# Patient Record
Sex: FEMALE | Race: BLACK OR AFRICAN AMERICAN | NOT HISPANIC OR LATINO
[De-identification: names, ages, dates, MRNs, and addresses within clinical notes are randomized per-mention and may not be internally consistent; named-entity substitution may affect disease eponyms.]

---

## 2017-12-04 ENCOUNTER — APPOINTMENT (OUTPATIENT)
Dept: NEUROLOGY | Facility: CLINIC | Age: 77
End: 2017-12-04
Payer: MEDICARE

## 2017-12-04 VITALS
BODY MASS INDEX: 30.39 KG/M2 | SYSTOLIC BLOOD PRESSURE: 148 MMHG | HEIGHT: 64.25 IN | DIASTOLIC BLOOD PRESSURE: 66 MMHG | HEART RATE: 84 BPM | WEIGHT: 178 LBS

## 2017-12-04 DIAGNOSIS — R41.3 OTHER AMNESIA: ICD-10-CM

## 2017-12-04 DIAGNOSIS — Z80.9 FAMILY HISTORY OF MALIGNANT NEOPLASM, UNSPECIFIED: ICD-10-CM

## 2017-12-04 DIAGNOSIS — N18.3 CHRONIC KIDNEY DISEASE, STAGE 3 (MODERATE): ICD-10-CM

## 2017-12-04 DIAGNOSIS — Z83.518 FAMILY HISTORY OF OTHER SPECIFIED EYE DISORDER: ICD-10-CM

## 2017-12-04 DIAGNOSIS — Z86.39 PERSONAL HISTORY OF OTHER ENDOCRINE, NUTRITIONAL AND METABOLIC DISEASE: ICD-10-CM

## 2017-12-04 DIAGNOSIS — M54.5 LOW BACK PAIN: ICD-10-CM

## 2017-12-04 DIAGNOSIS — F32.9 MAJOR DEPRESSIVE DISORDER, SINGLE EPISODE, UNSPECIFIED: ICD-10-CM

## 2017-12-04 DIAGNOSIS — Z83.3 FAMILY HISTORY OF DIABETES MELLITUS: ICD-10-CM

## 2017-12-04 DIAGNOSIS — G45.9 TRANSIENT CEREBRAL ISCHEMIC ATTACK, UNSPECIFIED: ICD-10-CM

## 2017-12-04 DIAGNOSIS — H91.93 UNSPECIFIED HEARING LOSS, BILATERAL: ICD-10-CM

## 2017-12-04 DIAGNOSIS — H40.819: ICD-10-CM

## 2017-12-04 DIAGNOSIS — Z82.49 FAMILY HISTORY OF ISCHEMIC HEART DISEASE AND OTHER DISEASES OF THE CIRCULATORY SYSTEM: ICD-10-CM

## 2017-12-04 DIAGNOSIS — Z86.79 PERSONAL HISTORY OF OTHER DISEASES OF THE CIRCULATORY SYSTEM: ICD-10-CM

## 2017-12-04 DIAGNOSIS — Z86.69 PERSONAL HISTORY OF OTHER DISEASES OF THE NERVOUS SYSTEM AND SENSE ORGANS: ICD-10-CM

## 2017-12-04 PROCEDURE — 99204 OFFICE O/P NEW MOD 45 MIN: CPT

## 2017-12-14 ENCOUNTER — APPOINTMENT (OUTPATIENT)
Dept: NEUROLOGY | Facility: CLINIC | Age: 77
End: 2017-12-14

## 2018-01-05 ENCOUNTER — APPOINTMENT (OUTPATIENT)
Dept: NEUROLOGY | Facility: CLINIC | Age: 78
End: 2018-01-05

## 2020-03-21 ENCOUNTER — EMERGENCY (EMERGENCY)
Facility: HOSPITAL | Age: 80
LOS: 1 days | Discharge: HOME | End: 2020-03-21
Attending: EMERGENCY MEDICINE
Payer: COMMERCIAL

## 2020-03-21 VITALS
WEIGHT: 169.98 LBS | SYSTOLIC BLOOD PRESSURE: 184 MMHG | HEART RATE: 66 BPM | HEIGHT: 65 IN | RESPIRATION RATE: 18 BRPM | DIASTOLIC BLOOD PRESSURE: 76 MMHG | TEMPERATURE: 98 F | OXYGEN SATURATION: 100 %

## 2020-03-21 LAB
BASOPHILS # BLD AUTO: 0.04 K/UL — SIGNIFICANT CHANGE UP (ref 0–0.2)
BASOPHILS NFR BLD AUTO: 0.4 % — SIGNIFICANT CHANGE UP (ref 0–2)
EOSINOPHIL # BLD AUTO: 0 K/UL — SIGNIFICANT CHANGE UP (ref 0–0.5)
EOSINOPHIL NFR BLD AUTO: 0 % — SIGNIFICANT CHANGE UP (ref 0–6)
HCT VFR BLD CALC: 36.2 % — SIGNIFICANT CHANGE UP (ref 34.5–45)
HGB BLD-MCNC: 11.9 G/DL — SIGNIFICANT CHANGE UP (ref 11.5–15.5)
IMM GRANULOCYTES NFR BLD AUTO: 0.3 % — SIGNIFICANT CHANGE UP (ref 0–1.5)
LYMPHOCYTES # BLD AUTO: 0.65 K/UL — LOW (ref 1–3.3)
LYMPHOCYTES # BLD AUTO: 6.4 % — LOW (ref 13–44)
MCHC RBC-ENTMCNC: 31.6 PG — SIGNIFICANT CHANGE UP (ref 27–34)
MCHC RBC-ENTMCNC: 32.9 GM/DL — SIGNIFICANT CHANGE UP (ref 32–36)
MCV RBC AUTO: 96 FL — SIGNIFICANT CHANGE UP (ref 80–100)
MONOCYTES # BLD AUTO: 0.37 K/UL — SIGNIFICANT CHANGE UP (ref 0–0.9)
MONOCYTES NFR BLD AUTO: 3.7 % — SIGNIFICANT CHANGE UP (ref 2–14)
NEUTROPHILS # BLD AUTO: 9 K/UL — HIGH (ref 1.8–7.4)
NEUTROPHILS NFR BLD AUTO: 89.2 % — HIGH (ref 43–77)
PLATELET # BLD AUTO: 326 K/UL — SIGNIFICANT CHANGE UP (ref 150–400)
RBC # BLD: 3.77 M/UL — LOW (ref 3.8–5.2)
RBC # FLD: 12.1 % — SIGNIFICANT CHANGE UP (ref 10.3–14.5)
WBC # BLD: 10.09 K/UL — SIGNIFICANT CHANGE UP (ref 3.8–10.5)
WBC # FLD AUTO: 10.09 K/UL — SIGNIFICANT CHANGE UP (ref 3.8–10.5)

## 2020-03-21 PROCEDURE — 99285 EMERGENCY DEPT VISIT HI MDM: CPT

## 2020-03-21 PROCEDURE — 93010 ELECTROCARDIOGRAM REPORT: CPT

## 2020-03-21 RX ORDER — ACETAMINOPHEN 500 MG
650 TABLET ORAL ONCE
Refills: 0 | Status: COMPLETED | OUTPATIENT
Start: 2020-03-21 | End: 2020-03-21

## 2020-03-21 RX ORDER — METOCLOPRAMIDE HCL 10 MG
10 TABLET ORAL ONCE
Refills: 0 | Status: COMPLETED | OUTPATIENT
Start: 2020-03-21 | End: 2020-03-21

## 2020-03-21 RX ADMIN — Medication 650 MILLIGRAM(S): at 23:07

## 2020-03-21 RX ADMIN — Medication 104 MILLIGRAM(S): at 23:07

## 2020-03-21 NOTE — ED ADULT NURSE NOTE - CAS ELECT INFOMATION PROVIDED
DC instructions/DC instructions provided to the patient. Patient in understanding of all dc instructions. VSS. Ambulatory.

## 2020-03-21 NOTE — ED PROVIDER NOTE - FAMILY DETAILS FREE TEXT FOR MDM ADDL HISTORY OBTAINED FROM QUESTION
Patient's daughter Swapna states that patient was diaphoretic at home and kept reporting that she was having dizziness with the room spinning.  Her and her sister checked her mother's sugar and it was not low.  She states patient has not taken her medication today because of nausea.

## 2020-03-21 NOTE — ED ADULT NURSE NOTE - OBJECTIVE STATEMENT
pt presents to the ed a&ox3 c/o headache and nausea, denies any vomiting or chest pain. no s/s of acute distress, seen and eval by md mathias, labs collected and sent, ekg complete and orthostatics complete.

## 2020-03-21 NOTE — ED PROVIDER NOTE - OBJECTIVE STATEMENT
This patient is a 79 year old woman with hx of "tension migraines" who presents to the ER c/o headache and nausea x 1 day.  She denies injury, fall, or trauma.  Patient describes the headache as a dull pressure in the back of her head 3/10 in severity.  She did not take any medication for pain.  Patient states that she often times has nausea with her headaches but today she states that the nausea was much worse. This patient is a 79 year old woman with hx of "tension migraines" who presents to the ER c/o headache and nausea x 1 day.  She denies injury, fall, or trauma.  Patient describes the headache as a dull pressure in the back of her head 3/10 in severity.  She did not take any medication for pain.  Patient states that she often times has nausea with her headaches but today she states that the nausea was much worse.  Associated symptoms include lightheadedness she denies vertigo.

## 2020-03-21 NOTE — ED PROVIDER NOTE - NEUROLOGICAL, MLM
Alert and oriented, no focal deficits, no motor or sensory deficits.  Normal finger to nose; normal gait no ataxia

## 2020-03-21 NOTE — ED PROVIDER NOTE - PROGRESS NOTE DETAILS
An attempt was made to reach the patient's daughter no answer when calling phone number listed in the chart. Patient's daughter called stating her mother was diaphoretic and having vertigo. milton: pt signed out by dr. mathias pending ct head--if neg bring to obs for mri to eval for cva; 3 trops for acs no cp + nauseas

## 2020-03-21 NOTE — ED PROVIDER NOTE - CLINICAL SUMMARY MEDICAL DECISION MAKING FREE TEXT BOX
Patient with tension headache reporting headache with increased nausea and dizziness.  Patient signed out to Dr. Mantilla pending CT and will likely need to be placed on observation if dizziness symptoms continue.

## 2020-03-22 VITALS
HEART RATE: 71 BPM | RESPIRATION RATE: 18 BRPM | TEMPERATURE: 98 F | SYSTOLIC BLOOD PRESSURE: 151 MMHG | DIASTOLIC BLOOD PRESSURE: 68 MMHG | OXYGEN SATURATION: 99 %

## 2020-03-22 LAB
ALBUMIN SERPL ELPH-MCNC: 4 G/DL — SIGNIFICANT CHANGE UP (ref 3.3–5.2)
ALP SERPL-CCNC: 128 U/L — HIGH (ref 40–120)
ALT FLD-CCNC: 18 U/L — SIGNIFICANT CHANGE UP
ANION GAP SERPL CALC-SCNC: 14 MMOL/L — SIGNIFICANT CHANGE UP (ref 5–17)
AST SERPL-CCNC: 23 U/L — SIGNIFICANT CHANGE UP
BILIRUB SERPL-MCNC: <0.2 MG/DL — LOW (ref 0.4–2)
BUN SERPL-MCNC: 15 MG/DL — SIGNIFICANT CHANGE UP (ref 8–20)
CALCIUM SERPL-MCNC: 9.2 MG/DL — SIGNIFICANT CHANGE UP (ref 8.6–10.2)
CHLORIDE SERPL-SCNC: 102 MMOL/L — SIGNIFICANT CHANGE UP (ref 98–107)
CO2 SERPL-SCNC: 24 MMOL/L — SIGNIFICANT CHANGE UP (ref 22–29)
CREAT SERPL-MCNC: 1.42 MG/DL — HIGH (ref 0.5–1.3)
GLUCOSE BLDC GLUCOMTR-MCNC: 167 MG/DL — HIGH (ref 70–99)
GLUCOSE SERPL-MCNC: 240 MG/DL — HIGH (ref 70–99)
LIDOCAIN IGE QN: 15 U/L — LOW (ref 22–51)
POTASSIUM SERPL-MCNC: 4.9 MMOL/L — SIGNIFICANT CHANGE UP (ref 3.5–5.3)
POTASSIUM SERPL-SCNC: 4.9 MMOL/L — SIGNIFICANT CHANGE UP (ref 3.5–5.3)
PROT SERPL-MCNC: 6.6 G/DL — SIGNIFICANT CHANGE UP (ref 6.6–8.7)
SODIUM SERPL-SCNC: 140 MMOL/L — SIGNIFICANT CHANGE UP (ref 135–145)
TROPONIN T SERPL-MCNC: <0.01 NG/ML — SIGNIFICANT CHANGE UP (ref 0–0.06)
TROPONIN T SERPL-MCNC: <0.01 NG/ML — SIGNIFICANT CHANGE UP (ref 0–0.06)

## 2020-03-22 PROCEDURE — 93005 ELECTROCARDIOGRAM TRACING: CPT

## 2020-03-22 PROCEDURE — 99220: CPT

## 2020-03-22 PROCEDURE — 80053 COMPREHEN METABOLIC PANEL: CPT

## 2020-03-22 PROCEDURE — 70551 MRI BRAIN STEM W/O DYE: CPT | Mod: 26

## 2020-03-22 PROCEDURE — 83690 ASSAY OF LIPASE: CPT

## 2020-03-22 PROCEDURE — 70450 CT HEAD/BRAIN W/O DYE: CPT

## 2020-03-22 PROCEDURE — 93010 ELECTROCARDIOGRAM REPORT: CPT

## 2020-03-22 PROCEDURE — 70450 CT HEAD/BRAIN W/O DYE: CPT | Mod: 26

## 2020-03-22 PROCEDURE — 84484 ASSAY OF TROPONIN QUANT: CPT

## 2020-03-22 PROCEDURE — 96366 THER/PROPH/DIAG IV INF ADDON: CPT

## 2020-03-22 PROCEDURE — 85027 COMPLETE CBC AUTOMATED: CPT

## 2020-03-22 PROCEDURE — 82550 ASSAY OF CK (CPK): CPT

## 2020-03-22 PROCEDURE — 70551 MRI BRAIN STEM W/O DYE: CPT

## 2020-03-22 PROCEDURE — G0378: CPT

## 2020-03-22 PROCEDURE — 36415 COLL VENOUS BLD VENIPUNCTURE: CPT

## 2020-03-22 PROCEDURE — 99284 EMERGENCY DEPT VISIT MOD MDM: CPT | Mod: 25

## 2020-03-22 PROCEDURE — 96365 THER/PROPH/DIAG IV INF INIT: CPT

## 2020-03-22 PROCEDURE — 82962 GLUCOSE BLOOD TEST: CPT

## 2020-03-22 RX ORDER — DEXTROSE 50 % IN WATER 50 %
15 SYRINGE (ML) INTRAVENOUS ONCE
Refills: 0 | Status: DISCONTINUED | OUTPATIENT
Start: 2020-03-22 | End: 2020-03-26

## 2020-03-22 RX ORDER — ATORVASTATIN CALCIUM 80 MG/1
20 TABLET, FILM COATED ORAL AT BEDTIME
Refills: 0 | Status: DISCONTINUED | OUTPATIENT
Start: 2020-03-22 | End: 2020-03-26

## 2020-03-22 RX ORDER — ASPIRIN/CALCIUM CARB/MAGNESIUM 324 MG
325 TABLET ORAL DAILY
Refills: 0 | Status: DISCONTINUED | OUTPATIENT
Start: 2020-03-22 | End: 2020-03-22

## 2020-03-22 RX ORDER — DEXTROSE 50 % IN WATER 50 %
25 SYRINGE (ML) INTRAVENOUS ONCE
Refills: 0 | Status: DISCONTINUED | OUTPATIENT
Start: 2020-03-22 | End: 2020-03-26

## 2020-03-22 RX ORDER — DEXTROSE 50 % IN WATER 50 %
12.5 SYRINGE (ML) INTRAVENOUS ONCE
Refills: 0 | Status: DISCONTINUED | OUTPATIENT
Start: 2020-03-22 | End: 2020-03-26

## 2020-03-22 RX ORDER — GLUCAGON INJECTION, SOLUTION 0.5 MG/.1ML
1 INJECTION, SOLUTION SUBCUTANEOUS ONCE
Refills: 0 | Status: DISCONTINUED | OUTPATIENT
Start: 2020-03-22 | End: 2020-03-26

## 2020-03-22 RX ORDER — INSULIN LISPRO 100/ML
VIAL (ML) SUBCUTANEOUS
Refills: 0 | Status: DISCONTINUED | OUTPATIENT
Start: 2020-03-22 | End: 2020-03-26

## 2020-03-22 RX ORDER — SODIUM CHLORIDE 9 MG/ML
1000 INJECTION, SOLUTION INTRAVENOUS
Refills: 0 | Status: DISCONTINUED | OUTPATIENT
Start: 2020-03-22 | End: 2020-03-26

## 2020-03-22 RX ORDER — MECLIZINE HCL 12.5 MG
25 TABLET ORAL EVERY 6 HOURS
Refills: 0 | Status: DISCONTINUED | OUTPATIENT
Start: 2020-03-22 | End: 2020-03-26

## 2020-03-22 RX ORDER — METFORMIN HYDROCHLORIDE 850 MG/1
1000 TABLET ORAL ONCE
Refills: 0 | Status: COMPLETED | OUTPATIENT
Start: 2020-03-22 | End: 2020-03-22

## 2020-03-22 RX ORDER — PANTOPRAZOLE SODIUM 20 MG/1
40 TABLET, DELAYED RELEASE ORAL
Refills: 0 | Status: DISCONTINUED | OUTPATIENT
Start: 2020-03-22 | End: 2020-03-26

## 2020-03-22 RX ORDER — METOPROLOL TARTRATE 50 MG
50 TABLET ORAL DAILY
Refills: 0 | Status: DISCONTINUED | OUTPATIENT
Start: 2020-03-22 | End: 2020-03-26

## 2020-03-22 RX ORDER — AMLODIPINE BESYLATE 2.5 MG/1
10 TABLET ORAL ONCE
Refills: 0 | Status: COMPLETED | OUTPATIENT
Start: 2020-03-22 | End: 2020-03-22

## 2020-03-22 RX ADMIN — Medication 650 MILLIGRAM(S): at 02:00

## 2020-03-22 RX ADMIN — Medication 25 MILLIGRAM(S): at 01:53

## 2020-03-22 RX ADMIN — Medication 25 MILLIGRAM(S): at 05:13

## 2020-03-22 RX ADMIN — METFORMIN HYDROCHLORIDE 1000 MILLIGRAM(S): 850 TABLET ORAL at 02:05

## 2020-03-22 RX ADMIN — ATORVASTATIN CALCIUM 20 MILLIGRAM(S): 80 TABLET, FILM COATED ORAL at 01:52

## 2020-03-22 RX ADMIN — Medication 50 MILLIGRAM(S): at 01:53

## 2020-03-22 RX ADMIN — Medication 325 MILLIGRAM(S): at 01:53

## 2020-03-22 RX ADMIN — AMLODIPINE BESYLATE 10 MILLIGRAM(S): 2.5 TABLET ORAL at 01:53

## 2020-03-22 RX ADMIN — PANTOPRAZOLE SODIUM 40 MILLIGRAM(S): 20 TABLET, DELAYED RELEASE ORAL at 05:13

## 2020-03-22 RX ADMIN — Medication 10 MILLIGRAM(S): at 00:54

## 2020-03-22 NOTE — ED CDU PROVIDER INITIAL DAY NOTE - PROGRESS NOTE DETAILS
pt stable overnight, no dizziness, nausea, pt has midl headache at back of the neck. No fall or trauma No episode in MRI, tolerated well, pending report    pt is alert, well appearing female, s1s2 normal reg, b/l clear breath sounds, neuro exam aox3, cn 2-12 intact, no focal deficits, no focal weakness, no focal droop

## 2020-03-22 NOTE — ED CDU PROVIDER INITIAL DAY NOTE - ATTENDING CONTRIBUTION TO CARE
I, Ilene Bryant MD have personally performed a face to face diagnostic evaluation on this patient.  I have reviewed the ACP note and agree with the history, exam, and plan of care, except as noted.     Exam:  Head: atraumatic, normacephalic  Face: atraumatic, no crepitus no orbiral/maxillary/mandibular ttp  throat: uvula midline no exudates  eyes: perrla eomi  heart: rrr s1s2  lungs: ctab  abd: soft, nt nd +bs no rebound/guarding no cva ttp  skin: warm  LE: no swelling, no calf ttp  back: no midline cervical/thoracic/lumbar ttp  neuro: aa ox 3 cn iii-xii intact normal finger to nose    --->pt in obs for vertigo like symptoms currently feeling better pending MRI

## 2020-03-22 NOTE — ED CDU PROVIDER DISPOSITION NOTE - ATTENDING CONTRIBUTION TO CARE
Damien VALENZUELA-  pt stable overnight, no dizziness, nausea, pt has mild headache at back of the neck. No fall or trauma No episode in MRI, tolerated well.  pt is alert, well appearing female, s1s2 normal reg, b/l clear breath sounds, neuro exam aox3, cn 2-12 intact, no focal deficits, no focal weakness, no focal droop.

## 2020-03-22 NOTE — ED ADULT NURSE REASSESSMENT NOTE - ANCILLARY STATUS
Pt c/o itchy, red rash, started yesterday on her neck and chest. Has since spread to her back and abd. She states no new lotions, soaps, or detergent. She has not ate anything new. She has not taken any allergy medication for it.     Patient would like communication of their results via:        Cell Phone:   Telephone Information:   Mobile [phone]     Okay to leave a message containing results? Yes     awaiting radiology/MRI

## 2020-03-22 NOTE — ED ADULT NURSE REASSESSMENT NOTE - PERIPHERAL VASCULAR
NEW INFORMATION ON THE REFILL LINE  In an effort to continue to provide you with superior service and quality, the decision has been made to inactivate the refill line at our clinic.    ? For Refill requests other than controlled substances  Please contact your pharmacy at least three (3) business days before your medication runs out.   The pharmacy will then send us a request for your refill.  Please allow 24-48 hours for the refill to be processed.       ? For Controlled substance Refill requests   Please call the clinic Monday through Friday, from 8:00am - 5:00pm to speak with a Patient .      LAB AND X-RAY HOURS FOR [address] [place] [address]  Monday - Thursday 7 am - 5:30 pm  Friday 7 am - 4:30 pm      TEST RESULTS  If your physician has ordered additional laboratory or radiology testing as part of your ongoing plan of care, notification of the test results will be communicated in a timely manner once reviewed by your provider.   -  If your results are normal, you will receive a letter in the mail.   -  If there are any irregularities, you will receive a phone call from our office within 5 - 7 business days.   -  If your results are critical and require more immediate intervention, you will be contacted promptly.       YouR OPINION MATTERS  You may be receiving a patient satisfaction survey in the mail. We would appreciate it if you could please take the time to complete, as your feedback is very important to us. We strive to make your experience exceptional and your comments help us with that goal. We look forward to hearing from you. Feedback is anonymous unless you choose otherwise.      TAYLOR GUERRA DPM   Practice Specialty: Podiatry   [facility], [place] [address]    Phone: [phone]  Fax: [phone]       
- - -

## 2020-03-22 NOTE — ED CDU PROVIDER INITIAL DAY NOTE - MEDICAL DECISION MAKING DETAILS
PT with HA and dizziness  placed in obs for Diagnostic uncertainty. PT seen BY OBS PA found to be appropriate CDU PT care transferred to PA.

## 2020-03-22 NOTE — ED CDU PROVIDER DISPOSITION NOTE - NSFOLLOWUPINSTRUCTIONS_ED_ALL_ED_FT
1) YOUR TESTING WAS NEGATIVE FOR AN ACUTE STROKE  2) DRINK PLENTY OF FLUIDS  3) THERE WERE CHRONIC CHANGES IN YOUR BRAIN, IF YOU ARE NOT ALREADY TAKING A DAILY ASPIRIN START 81MG DAILY  4) FOLLOW UP WITH NEUROLOGY

## 2020-03-22 NOTE — ED CDU PROVIDER INITIAL DAY NOTE - NS ED ROS FT
ROS: CONTUSIONAL: Denies fever, chills, fatigue, wt loss. HEAD: Denies trauma, Dizziness. EYE: Denies Acute visual changes, diplopia. ENMT: Denies change in hearing, tinnitus, epistaxis, difficulty swallowing, sore throat. CARDIO: Denies CP, palpitations, edema. RESP: Denies Cough, SOB , Diff breathing, hemoptysis. GI: Denies N/V, ABD pain, change in bowel movement. URINARY: Denies difficulty urinating, pelvic pain. MS:  Denies joint pain, back pain, weakness, decreased ROM, swelling. NEURO: Denies change in gait, seizures, loss of sensation, confusion LOC.  PSY: NO SI/HI.

## 2020-03-22 NOTE — ED ADULT NURSE REASSESSMENT NOTE - NS ED NURSE REASSESS COMMENT FT1
Per NP Rispoli orders dc npo orders, patient ok to have breakfast.
Pt alert and oriented, no apparent distress noted at this time. Pt handed off Ailyn MAXWELL in stable condition.
assumed care of pt @ 0200, report received from Jeniffer MAXWELL, charting as noted. pt AOx4 in NAD, Vital Signs Stable. pt admits to mild headache, denies any dizziness/vertigo at this time. NIH 0, GCS 15. pt awaiting repeat trop/ekg @ 0330/0530 and MRI. pt agreeable with plan of care. pt educated on call bell use. fall precautions in place: stretcher locked in lowest position, call bell in reach, side rails up, non skid socks on.
pt ambulated to bathroom with standby assist in NAD. steady gait. pt denies any dizziness/lightheadedness while ambulating.
Assumed care of the patient at 0730. Patient A&Ox4. No s/s of distress. No neuro deficits noted. Patient with mild HA. Denies numbness/tingling/blurry VA. NSR on CM. PIV patent. VSS. Patient ambulatory, steady gait. Patient pending MRI testing. Patient in understanding of plan of care. Patient with no further questions for the RN. Resting in comfort. Call bell within reach and encouraged to use when assistance needed. Will continue to monitor.

## 2020-03-22 NOTE — ED CDU PROVIDER INITIAL DAY NOTE - OBJECTIVE STATEMENT
This patient is a 79 year old woman with hx of "tension migraines" who presents to the ER c/o headache and nausea x 1 day.  She denies injury, fall, or trauma.  Patient describes the headache as a dull pressure in the back of her head 3/10 in severity.  She did not take any medication for pain.  Patient states that she often times has nausea with her headaches but today she states that the nausea was much worse.  Associated symptoms include lightheadedness she denies vertigo.

## 2020-03-22 NOTE — ED CDU PROVIDER DISPOSITION NOTE - CLINICAL COURSE
This patient is a 79 year old woman with hx of "tension migraines" who presents to the ER c/o headache and nausea x 1 day.  She denies injury, fall, or trauma.  Patient describes the headache as a dull pressure in the back of her head 3/10 in severity.  She did not take any medication for pain.  Patient states that she often times has nausea with her headaches but today she states that the nausea was much worse.  Associated symptoms include lightheadedness she denies vertigo.  Dizziness resolved, mild left HA, MRI with chronic changes, no acute pathology.  Recommend asa if not already on, refer to neurology.

## 2020-03-22 NOTE — ED ADULT NURSE REASSESSMENT NOTE - COMFORT CARE
side rails up/ambulated to bathroom/plan of care explained/repositioned/po fluids offered/wait time explained/meal provided

## 2020-03-22 NOTE — ED CDU PROVIDER DISPOSITION NOTE - CARE PROVIDER_API CALL
Erickson Sánchez; PhD)  Neurology; Vascular Neurology  [address] [place] [address]  Phone: [phone]  Fax: [phone]  Follow Up Time:

## 2020-03-22 NOTE — ED CDU PROVIDER DISPOSITION NOTE - PATIENT PORTAL LINK FT
Subjective:       Patient ID: Vicki Durham is a 72 y.o. female.    Chief Complaint: Annual Exam    Disclaimer: This note has been generated using voice-recognition software. There may be typographical errors that have been missed during proof-reading    71 yo presents for routine exam, last exam one year ago  Immunizations:  UTD  Colonoscopy:  2009, normal  Seen by Nephrology about one year ago with CKD, AKD, has not taken any more NSAIDs      Review of Systems   Constitutional: Negative.  Negative for activity change, appetite change, chills, diaphoresis, fatigue, fever and unexpected weight change.   HENT: Negative.  Negative for congestion, ear pain, hearing loss, rhinorrhea, sinus pressure, sore throat, tinnitus, trouble swallowing and voice change.    Eyes: Negative.  Negative for photophobia, pain and visual disturbance.   Respiratory: Negative.  Negative for cough, chest tightness and shortness of breath.    Cardiovascular: Negative.  Negative for chest pain, palpitations and leg swelling.   Gastrointestinal: Negative.  Negative for abdominal distention, abdominal pain, blood in stool, constipation, diarrhea, nausea and vomiting.   Genitourinary: Negative.  Negative for difficulty urinating, dysuria, frequency and hematuria.   Musculoskeletal: Negative.  Negative for arthralgias, back pain, joint swelling, neck pain and neck stiffness.   Skin: Negative for color change, pallor and rash.   Neurological: Negative.  Negative for dizziness, tremors, speech difficulty, weakness, light-headedness, numbness and headaches.   Hematological: Negative for adenopathy. Does not bruise/bleed easily.   Psychiatric/Behavioral: Negative for agitation, confusion, dysphoric mood, hallucinations and sleep disturbance. The patient is not nervous/anxious.        Objective:      Physical Exam   Constitutional: She is oriented to person, place, and time. She appears well-developed and well-nourished.   Obese, NAD   HENT:   Right  Ear: Tympanic membrane and external ear normal.   Left Ear: Tympanic membrane and external ear normal.   Nose: Nose normal.   Mouth/Throat: Oropharynx is clear and moist.   Eyes: Conjunctivae and EOM are normal. Pupils are equal, round, and reactive to light.   Neck: Normal range of motion. Neck supple. No tracheal deviation present. No thyromegaly present.   Cardiovascular: Normal rate, regular rhythm, normal heart sounds and intact distal pulses.    Pulmonary/Chest: Effort normal. She has no wheezes. She has no rales. She exhibits no tenderness.   Abdominal: Soft. Bowel sounds are normal. She exhibits no distension and no mass. There is no rebound.   Genitourinary:   Genitourinary Comments: Breasts show no masses or nipple retraction, no axillary adenopathy     Musculoskeletal: Normal range of motion. She exhibits no edema or tenderness.   Lymphadenopathy:     She has no cervical adenopathy.   Neurological: She is alert and oriented to person, place, and time. She has normal reflexes. No cranial nerve deficit. Coordination normal.   Skin: Skin is warm and dry. No rash noted. No erythema.   Psychiatric: She has a normal mood and affect. Her behavior is normal.       Assessment:        1.  Physical exam  2.  Hypertension  3.  Hyperlipidemia  4.  CKD, sp nephrectomy   Plan:       1.  Labs reviewed with pt  2.  UA, mammogram  3.  Start Amlodipine 5mg daily, f/u one month   4.  Weight loss   You can access the FollowMyHealth Patient Portal offered by Health system by registering at the following website: [email or web address]. By joining [facility]’s FollowMyHealth portal, you will also be able to view your health information using other applications (apps) compatible with our system.

## 2020-09-28 ENCOUNTER — APPOINTMENT (OUTPATIENT)
Dept: NEUROLOGY | Facility: CLINIC | Age: 80
End: 2020-09-28
Payer: MEDICARE

## 2020-09-28 VITALS — SYSTOLIC BLOOD PRESSURE: 118 MMHG | DIASTOLIC BLOOD PRESSURE: 60 MMHG | HEIGHT: 65 IN

## 2020-09-28 DIAGNOSIS — Z81.8 FAMILY HISTORY OF OTHER MENTAL AND BEHAVIORAL DISORDERS: ICD-10-CM

## 2020-09-28 DIAGNOSIS — Z78.9 OTHER SPECIFIED HEALTH STATUS: ICD-10-CM

## 2020-09-28 PROCEDURE — 99214 OFFICE O/P EST MOD 30 MIN: CPT

## 2020-09-28 RX ORDER — METFORMIN HYDROCHLORIDE 1000 MG/1
1000 TABLET, FILM COATED ORAL
Refills: 0 | Status: DISCONTINUED | COMMUNITY
End: 2020-09-28

## 2020-09-28 RX ORDER — ALBUTEROL 90 MCG
90 AEROSOL (GRAM) INHALATION
Refills: 0 | Status: DISCONTINUED | COMMUNITY
End: 2020-09-28

## 2020-09-28 RX ORDER — CELECOXIB 200 MG/1
200 CAPSULE ORAL
Refills: 0 | Status: DISCONTINUED | COMMUNITY
End: 2020-09-28

## 2020-09-28 RX ORDER — CITALOPRAM HYDROBROMIDE 20 MG/1
20 TABLET, FILM COATED ORAL
Refills: 0 | Status: DISCONTINUED | COMMUNITY
End: 2020-09-28

## 2020-09-28 NOTE — REVIEW OF SYSTEMS
[As Noted in HPI] : as noted in HPI [Memory Lapses or Loss] : memory loss [Repeating Questions] : repeated questioning about recent events [Negative] : Heme/Lymph

## 2020-09-28 NOTE — CONSULT LETTER
[Dear  ___] : Dear  [unfilled], [Consult Letter:] : I had the pleasure of evaluating your patient, [unfilled]. [Please see my note below.] : Please see my note below. [Consult Closing:] : Thank you very much for allowing me to participate in the care of this patient.  If you have any questions, please do not hesitate to contact me. [Sincerely,] : Sincerely, [FreeTextEntry3] : Erickson Sánchez M.D., Ph.D. DPN-N\par [facility]\par Neurology at [place]\par Medical Director of Stroke Services\par [facility]\par

## 2020-09-28 NOTE — DATA REVIEWED
[de-identified] : MRI of her brain was done March 22, 2020 periods of small vessel ischemic changes without evidence for stroke mass or bleed.

## 2020-09-28 NOTE — HISTORY OF PRESENT ILLNESS
[FreeTextEntry1] : Initial office visit September 20, 2020:\par This is a 80-year-old woman who presents today for neurologic evaluation of memory loss. She is accompanied by her daughter who provides collaterals history. She has short-term memory issues. She does not seem to have issues with past her distant memory. She lives with her daughters and granddaughter. She relies on her family for assistance with most activities of daily living it appears. She repeats conversations. She does not drive. She apparently takes care of her own finances well. She is here today for neurologic evaluation and treatment.

## 2020-09-28 NOTE — ASSESSMENT
[FreeTextEntry1] : This is a 80-year-old woman with probable early dementia/mild cognitive impairment. At this point I discussed medications with her such as Aricept. After discussing risks benefits and alternatives including not taking medication at this time this is what she and her daughter chose. We'll reevaluate in 3 months and if there is any precipitous decline in memory start Aricept at that time. I also discussed with the daughter to call me in the interim should there be any change in memory we can always start Aricept sooner if needed.

## 2020-10-05 NOTE — ED ADULT NURSE NOTE - GLASGOW COMA SCALE: SCORE
Bed: 09  Expected date: 10/5/20  Expected time:   Means of arrival:   Comments:  Bell 50 (f) abd pain r/o sbo   17

## 2021-01-11 ENCOUNTER — APPOINTMENT (OUTPATIENT)
Dept: NEUROLOGY | Facility: CLINIC | Age: 81
End: 2021-01-11

## 2021-05-17 ENCOUNTER — APPOINTMENT (OUTPATIENT)
Dept: NEUROLOGY | Facility: CLINIC | Age: 81
End: 2021-05-17
Payer: MEDICARE

## 2021-05-17 VITALS — DIASTOLIC BLOOD PRESSURE: 70 MMHG | SYSTOLIC BLOOD PRESSURE: 128 MMHG

## 2021-05-17 VITALS — WEIGHT: 129.13 LBS | BODY MASS INDEX: 21.49 KG/M2

## 2021-05-17 VITALS — HEIGHT: 65 IN | TEMPERATURE: 97.4 F

## 2021-05-17 DIAGNOSIS — G31.84 MILD COGNITIVE IMPAIRMENT, SO STATED: ICD-10-CM

## 2021-05-17 PROCEDURE — 99214 OFFICE O/P EST MOD 30 MIN: CPT

## 2021-05-17 PROCEDURE — 99072 ADDL SUPL MATRL&STAF TM PHE: CPT

## 2021-05-17 NOTE — CONSULT LETTER
[Dear  ___] : Dear  [unfilled], [Courtesy Letter:] : I had the pleasure of seeing your patient, [unfilled], in my office today. [Please see my note below.] : Please see my note below. [Consult Closing:] : Thank you very much for allowing me to participate in the care of this patient.  If you have any questions, please do not hesitate to contact me. [Sincerely,] : Sincerely, [FreeTextEntry3] : Erickson Sánchez M.D., Ph.D. DPN-N\par [facility]\par Neurology at [place]\par Medical Director of Stroke Services\par [facility]\par

## 2021-05-17 NOTE — HISTORY OF PRESENT ILLNESS
[FreeTextEntry1] : Initial office visit September 20, 2020:\par This is a 80-year-old woman who presents today for neurologic evaluation of memory loss. She is accompanied by her daughter who provides collaterals history. She has short-term memory issues. She does not seem to have issues with past her distant memory. She lives with her daughters and granddaughter. She relies on her family for assistance with most activities of daily living it appears. She repeats conversations. She does not drive. She apparently takes care of her own finances well. She is here today for neurologic evaluation and treatment.\par \par Followup May 17, 2021:\par This is a 80-year-old woman presents today for followup. Her daughter accompanies her. Her daughter states that her memory is getting worse. She is more repetitive for questions. She forgets things very quickly. She was considering taking donepezil and still has some questions about it. She is here today for neurologic followup.

## 2021-05-17 NOTE — PHYSICAL EXAM
[Person] : oriented to person [Place] : oriented to place [Time] : disoriented to time [Short Term Intact] : short term memory impaired [Remote Intact] : remote memory intact [Registration Intact] : recent registration memory intact [Span Intact] : the attention span was normal [Concentration Intact] : normal concentrating ability [Visual Intact] : visual attention was ~T not ~L decreased [Naming Objects] : no difficulty naming common objects [Repeating Phrases] : no difficulty repeating a phrase [Fluency] : fluency intact [Comprehension] : comprehension intact [Current Events] : inadequate knowledge of current events [Past History] : adequate knowledge of personal past history [Cranial Nerves Optic (II)] : visual acuity intact bilaterally,  visual fields full to confrontation, pupils equal round and reactive to light [Cranial Nerves Oculomotor (III)] : extraocular motion intact [Cranial Nerves Trigeminal (V)] : facial sensation intact symmetrically [Cranial Nerves Facial (VII)] : face symmetrical [Cranial Nerves Vestibulocochlear (VIII)] : hearing was intact bilaterally [Cranial Nerves Glossopharyngeal (IX)] : tongue and palate midline [Cranial Nerves Accessory (XI - Cranial And Spinal)] : head turning and shoulder shrug symmetric [Cranial Nerves Hypoglossal (XII)] : there was no tongue deviation with protrusion [Motor Tone] : muscle tone was normal in all four extremities [Motor Strength] : muscle strength was normal in all four extremities [Involuntary Movements] : no involuntary movements were seen [No Muscle Atrophy] : normal bulk in all four extremities [Paresis Pronator Drift Right-Sided] : no pronator drift on the right [Paresis Pronator Drift Left-Sided] : no pronator drift on the left [Motor Strength Upper Extremities Bilaterally] : strength was normal in both upper extremities [Motor Strength Lower Extremities Bilaterally] : strength was normal in both lower extremities [Sensation Tactile Decrease] : light touch was intact [Sensation Pain / Temperature Decrease] : pain and temperature was intact [Sensation Vibration Decrease] : vibration was intact [Proprioception] : proprioception was intact [Abnormal Walk] : normal gait [Balance] : balance was intact [Tremor] : no tremor present [Coordination - Dysmetria Impaired Finger-to-Nose Bilateral] : not present [2+] : Patella left 2+ [FreeTextEntry4] : 0/3 recall, 2/3 with clues,,oriented to 2021, not month or date [Sclera] : the sclera and conjunctiva were normal [PERRL With Normal Accommodation] : pupils were equal in size, round, reactive to light, with normal accommodation [Extraocular Movements] : extraocular movements were intact [No APD] : no afferent pupillary defect [Full Visual Field] : full visual field [No YULISA] : no internuclear ophthalmoplegia

## 2021-05-17 NOTE — ASSESSMENT
[FreeTextEntry1] : This is a 80-year-old woman with short-term memory loss and likely early dementia. Her memory is getting worse per her daughter. At this time she had questions about starting donepezil and that now she is agreeable to start it. We'll start an episode 5 mg daily. I will see her back in 3 months and if she is doing well I will increase the dose to 10 mg daily.

## 2021-08-17 ENCOUNTER — APPOINTMENT (OUTPATIENT)
Dept: NEUROLOGY | Facility: CLINIC | Age: 81
End: 2021-08-17

## 2022-11-01 ENCOUNTER — NON-APPOINTMENT (OUTPATIENT)
Age: 82
End: 2022-11-01

## 2023-05-18 ENCOUNTER — NON-APPOINTMENT (OUTPATIENT)
Age: 83
End: 2023-05-18

## 2023-05-18 ENCOUNTER — APPOINTMENT (OUTPATIENT)
Dept: NEPHROLOGY | Facility: CLINIC | Age: 83
End: 2023-05-18
Payer: MEDICARE

## 2023-05-18 VITALS
HEART RATE: 77 BPM | OXYGEN SATURATION: 98 % | HEIGHT: 65 IN | SYSTOLIC BLOOD PRESSURE: 126 MMHG | BODY MASS INDEX: 29.32 KG/M2 | WEIGHT: 176 LBS | DIASTOLIC BLOOD PRESSURE: 64 MMHG

## 2023-05-18 DIAGNOSIS — I10 ESSENTIAL (PRIMARY) HYPERTENSION: ICD-10-CM

## 2023-05-18 DIAGNOSIS — E11.21 TYPE 2 DIABETES MELLITUS WITH DIABETIC NEPHROPATHY: ICD-10-CM

## 2023-05-18 DIAGNOSIS — N18.4 CHRONIC KIDNEY DISEASE, STAGE 4 (SEVERE): ICD-10-CM

## 2023-05-18 DIAGNOSIS — N18.9 CHRONIC KIDNEY DISEASE, UNSPECIFIED: ICD-10-CM

## 2023-05-18 DIAGNOSIS — M89.9 CHRONIC KIDNEY DISEASE, UNSPECIFIED: ICD-10-CM

## 2023-05-18 DIAGNOSIS — E83.9 CHRONIC KIDNEY DISEASE, UNSPECIFIED: ICD-10-CM

## 2023-05-18 DIAGNOSIS — D63.1 CHRONIC KIDNEY DISEASE, UNSPECIFIED: ICD-10-CM

## 2023-05-18 PROCEDURE — 99205 OFFICE O/P NEW HI 60 MIN: CPT

## 2023-05-18 RX ORDER — DONEPEZIL HYDROCHLORIDE 5 MG/1
5 TABLET ORAL DAILY
Qty: 30 | Refills: 2 | Status: DISCONTINUED | COMMUNITY
Start: 2021-04-08 | End: 2023-05-18

## 2023-05-18 RX ORDER — HUMAN INSULIN 100 [IU]/ML
100 INJECTION, SUSPENSION SUBCUTANEOUS
Refills: 0 | Status: DISCONTINUED | COMMUNITY
End: 2023-05-18

## 2023-05-18 RX ORDER — INSULIN DETEMIR 100 [IU]/ML
100 INJECTION, SOLUTION SUBCUTANEOUS
Refills: 0 | Status: DISCONTINUED | COMMUNITY
End: 2023-05-18

## 2023-05-18 NOTE — ASSESSMENT
[FreeTextEntry1] : CKD stage IV\par Likely DMN/ hypertensive nephropathy\par UA trace ; alb/cr ratio ~ 104 mg/g\par SCr 2.01; eGFR 25 ml/min; SCr 1.9 in Jan\par LDL at goal for CKD\par \par HTN\par Bp at goal, pt euvolemic on exam\par Continue amlodipine 10, Metoprolol 50 mg daily\par \par CKD- MBD\par on Vitamin D 1000 IU daily\par  \par Low phos diet\par Will start Calcitriol 0.5 mg\par \par Anemia o CKD \par HB above goal (11.6) \par EA oprn for Hb < 10\par \par DM- uncontrolled\par HbA1c now downtrending\par reiterated tight glycemic control to halt progression of kidney disease\par \par Rtc in 2 months

## 2023-05-18 NOTE — PHYSICAL EXAM
[General Appearance - Alert] : alert [General Appearance - In No Acute Distress] : in no acute distress [Hearing Threshold Finger Rub Not [place]] : hearing was normal [Auscultation Breath Sounds / Voice Sounds] : lungs were clear to auscultation bilaterally [Heart Sounds] : normal S1 and S2 [Edema] : there was no peripheral edema [Abdomen Soft] : soft [No CVA Tenderness] : no ~M costovertebral angle tenderness [Abnormal Walk] : normal gait [] : no rash [No Focal Deficits] : no focal deficits [Oriented To Time, Place, And Person] : oriented to person, place, and time [Impaired Insight] : insight and judgment were intact [FreeTextEntry1] : wears vision glasses

## 2023-05-18 NOTE — REASON FOR VISIT
[Initial Evaluation] : an initial evaluation [Family Member] : family member Alert and oriented to person, place and time

## 2023-05-18 NOTE — HISTORY OF PRESENT ILLNESS
[FreeTextEntry1] : 83 year old woman with PMH of DM, HTN, memory loss, CKD stage IV presenting for CKD evaluation.\par Pt seen and examined; accompanied by daughter Isabel.\par Pt states she feels well\par No acute complaint at this time\par \par pt has known h/o CKD and was previously seeing Dr. Rascon- now switching care to us.\par \par Pt was dx with DM about 35 years ago\par Sugars 'could be better'. She cant recall what her HbA1c is\par Denies diabetic retinopathy, neuropathy\par FBS this Am was 205; she is on Toujeo 15 units twice daily\par \par Dx with HTN 'many years ago'\par Checks BP at home and reports BP is 'normal' (130-140s/ 70) at home\par \par non smoker, no etoh/ drug abuse\par \par lives at home with daughters and grand daughter\par \par \par \par \par

## 2023-08-04 ENCOUNTER — APPOINTMENT (OUTPATIENT)
Dept: NEPHROLOGY | Facility: CLINIC | Age: 83
End: 2023-08-04

## 2024-03-10 PROBLEM — N18.9 CHRONIC KIDNEY DISEASE-MINERAL AND BONE DISORDER: Status: ACTIVE | Noted: 2023-05-18
